# Patient Record
Sex: FEMALE | Race: AMERICAN INDIAN OR ALASKA NATIVE | NOT HISPANIC OR LATINO | Employment: UNEMPLOYED | URBAN - METROPOLITAN AREA
[De-identification: names, ages, dates, MRNs, and addresses within clinical notes are randomized per-mention and may not be internally consistent; named-entity substitution may affect disease eponyms.]

---

## 2019-01-14 ENCOUNTER — HOSPITAL ENCOUNTER (EMERGENCY)
Facility: HOSPITAL | Age: 44
Discharge: HOME | End: 2019-01-14
Attending: EMERGENCY MEDICINE

## 2019-01-14 VITALS
RESPIRATION RATE: 20 BRPM | HEIGHT: 64 IN | SYSTOLIC BLOOD PRESSURE: 178 MMHG | OXYGEN SATURATION: 100 % | TEMPERATURE: 96 F | DIASTOLIC BLOOD PRESSURE: 79 MMHG | BODY MASS INDEX: 39.95 KG/M2 | WEIGHT: 234 LBS | HEART RATE: 90 BPM

## 2019-01-14 DIAGNOSIS — J01.00 ACUTE MAXILLARY SINUSITIS, RECURRENCE NOT SPECIFIED: ICD-10-CM

## 2019-01-14 DIAGNOSIS — H66.001 ACUTE SUPPURATIVE OTITIS MEDIA OF RIGHT EAR WITHOUT SPONTANEOUS RUPTURE OF TYMPANIC MEMBRANE, RECURRENCE NOT SPECIFIED: Primary | ICD-10-CM

## 2019-01-14 LAB
B-HCG UR QL: NEGATIVE
CTP QC/QA: YES

## 2019-01-14 PROCEDURE — 81025 URINE PREGNANCY TEST: CPT | Mod: ER | Performed by: EMERGENCY MEDICINE

## 2019-01-14 PROCEDURE — 99284 EMERGENCY DEPT VISIT MOD MDM: CPT | Mod: 25,ER

## 2019-01-14 PROCEDURE — 25000003 PHARM REV CODE 250: Mod: ER | Performed by: EMERGENCY MEDICINE

## 2019-01-14 RX ORDER — IBUPROFEN 600 MG/1
600 TABLET ORAL EVERY 6 HOURS PRN
Qty: 20 TABLET | Refills: 0 | OUTPATIENT
Start: 2019-01-14 | End: 2020-01-20

## 2019-01-14 RX ORDER — IBUPROFEN 600 MG/1
600 TABLET ORAL
Status: COMPLETED | OUTPATIENT
Start: 2019-01-14 | End: 2019-01-14

## 2019-01-14 RX ORDER — FLUTICASONE PROPIONATE 50 MCG
2 SPRAY, SUSPENSION (ML) NASAL DAILY
Qty: 1 BOTTLE | Refills: 0 | Status: SHIPPED | OUTPATIENT
Start: 2019-01-14

## 2019-01-14 RX ORDER — DIPHENHYDRAMINE HCL 25 MG
25 CAPSULE ORAL EVERY 6 HOURS PRN
Qty: 20 CAPSULE | Refills: 0 | Status: SHIPPED | OUTPATIENT
Start: 2019-01-14

## 2019-01-14 RX ORDER — AMOXICILLIN AND CLAVULANATE POTASSIUM 875; 125 MG/1; MG/1
1 TABLET, FILM COATED ORAL 2 TIMES DAILY
Qty: 20 TABLET | Refills: 0 | Status: SHIPPED | OUTPATIENT
Start: 2019-01-14 | End: 2019-01-24

## 2019-01-14 RX ORDER — ACETAMINOPHEN 500 MG
1000 TABLET ORAL EVERY 6 HOURS PRN
Qty: 30 TABLET | Refills: 0 | Status: SHIPPED | OUTPATIENT
Start: 2019-01-14

## 2019-01-14 RX ADMIN — IBUPROFEN 600 MG: 600 TABLET ORAL at 12:01

## 2019-01-14 NOTE — ED PROVIDER NOTES
Encounter Date: 1/14/2019    SCRIBE #1 NOTE: I, Catherine Lambert, am scribing for, and in the presence of,  Dr. Romano. I have scribed the following portions of the note - Other sections scribed: HPI, ROS, PE.       History     Chief Complaint   Patient presents with    Otalgia     right ear pain, onset 3 days, denies drainage     43 y.o female presents with right ear pain for 3 days. She notes congestion, mild sore throat, and rhinorrhea. Patient reports sinus pain and pressure getting worse.  Right ear pain is move worse when she opens and closes her mouth.  She took an Asprin at 8:00am this morning.  Aspirin does help a little bit. She denies fever or chills. No known health problems.       The history is provided by the patient.     Review of patient's allergies indicates:  No Known Allergies  Past Medical History:   Diagnosis Date    Ectopic pregnancy 12/15/2013    R     Past Surgical History:   Procedure Laterality Date    REMOVAL, ECTOPIC PREGNANCY N/A 12/15/2013    Performed by [facility] at [facility]    SALPINGECTOMY Right 12/15/2013     Family History   Problem Relation Age of Onset    Arthritis Mother     Diabetes Neg Hx     Hypertension Neg Hx      Social History     Tobacco Use    Smoking status: Never Smoker    Smokeless tobacco: Never Used   Substance Use Topics    Alcohol use: No    Drug use: No     Review of Systems   Constitutional: Negative for chills and fever.   HENT: Positive for congestion, ear pain (right), rhinorrhea and sore throat.    Respiratory: Negative for shortness of breath.    Cardiovascular: Negative for chest pain.   Gastrointestinal: Negative for nausea.   Genitourinary: Negative for dysuria.   Musculoskeletal: Negative for back pain.   Skin: Negative for rash.   Neurological: Negative for weakness.   Hematological: Does not bruise/bleed easily.   All other systems reviewed and are negative.      Physical Exam     Initial Vitals [01/14/19 1115]   BP Pulse Resp  Temp SpO2   137/83 99 20 97.9 °F (36.6 °C) 98 %      MAP       --         Physical Exam    Nursing note and vitals reviewed.  Constitutional: She appears well-developed and well-nourished. She is not diaphoretic. No distress.   HENT:   Head: Normocephalic and atraumatic.   Right Ear: External ear normal. Tympanic membrane is erythematous. No middle ear effusion.   Left Ear: Tympanic membrane, external ear and ear canal normal. Tympanic membrane is not erythematous.  No middle ear effusion.   Nose: Right sinus exhibits maxillary sinus tenderness.   Mouth/Throat: Uvula is midline and mucous membranes are normal. No trismus in the jaw. No uvula swelling. Posterior oropharyngeal erythema present. No oropharyngeal exudate, posterior oropharyngeal edema or tonsillar abscesses.   Eyes: EOM are normal. Pupils are equal, round, and reactive to light.   Neck: Normal range of motion. Neck supple.   Cardiovascular: Normal rate, regular rhythm, normal heart sounds and intact distal pulses. Exam reveals no gallop and no friction rub.    No murmur heard.  Pulmonary/Chest: Breath sounds normal. No respiratory distress. She has no wheezes. She has no rhonchi. She has no rales. She exhibits no tenderness.   Abdominal: Soft. Bowel sounds are normal. She exhibits no distension. There is no tenderness. There is no rebound and no guarding.   Musculoskeletal: Normal range of motion.   Neurological: She is alert and oriented to person, place, and time. She has normal strength. No cranial nerve deficit or sensory deficit.   Skin: Skin is warm and dry. Capillary refill takes less than 2 seconds.   Psychiatric: She has a normal mood and affect. Her behavior is normal.         ED Course   Procedures  Labs Reviewed   POCT URINE PREGNANCY    Urine pregnancy test negative            Medical Decision Making:   Clinical Tests:   Lab Tests: Ordered and Reviewed  ED Management:  UPT negative  Fill and take prescriptions as directed.  Return to the ED  if symptoms worsen or do not resolve.   Answered questions and discussed discharge plan.    Patient feels much better and is ready for discharge.  Follow up with PCP in 1 days.            Scribe Attestation:   Scribe #1: I performed the above scribed service and the documentation accurately describes the services I performed. I attest to the accuracy of the note.     I, Dr. Karina Romano, personally performed the services described in this documentation. This document was produced by a scribe under my direction and in my presence. All medical record entries made by the scribe were at my direction and in my presence.  I have reviewed the chart and agree that the record reflects my personal performance and is accurate and complete. Karina Romano DO.     01/14/2019 12:51 PM             Clinical Impression:     1. Acute suppurative otitis media of right ear without spontaneous rupture of tympanic membrane, recurrence not specified    2. Acute maxillary sinusitis, recurrence not specified                                 Karina Romano DO  01/14/19 8723

## 2020-01-20 ENCOUNTER — HOSPITAL ENCOUNTER (EMERGENCY)
Facility: HOSPITAL | Age: 45
Discharge: HOME | End: 2020-01-20
Attending: EMERGENCY MEDICINE
Payer: MEDICAID

## 2020-01-20 VITALS
BODY MASS INDEX: 34.8 KG/M2 | WEIGHT: 177.25 LBS | TEMPERATURE: 97 F | RESPIRATION RATE: 16 BRPM | HEIGHT: 60 IN | DIASTOLIC BLOOD PRESSURE: 55 MMHG | HEART RATE: 66 BPM | OXYGEN SATURATION: 96 % | SYSTOLIC BLOOD PRESSURE: 114 MMHG

## 2020-01-20 DIAGNOSIS — Z32.01 POSITIVE PREGNANCY TEST: Primary | ICD-10-CM

## 2020-01-20 LAB
B-HCG UR QL: POSITIVE
BILIRUBIN, POC UA: NEGATIVE
BLOOD, POC UA: NEGATIVE
CLARITY, POC UA: ABNORMAL
COLOR, POC UA: ABNORMAL
CTP QC/QA: YES
GLUCOSE, POC UA: NEGATIVE
KETONES, POC UA: NEGATIVE
LEUKOCYTE EST, POC UA: ABNORMAL
NITRITE, POC UA: NEGATIVE
PH UR STRIP: 7 [PH]
PROTEIN, POC UA: NEGATIVE
SPECIFIC GRAVITY, POC UA: 1.02
UROBILINOGEN, POC UA: 0.2 E.U./DL

## 2020-01-20 PROCEDURE — 99284 EMERGENCY DEPT VISIT MOD MDM: CPT | Mod: 25,ER

## 2020-01-20 PROCEDURE — 81025 URINE PREGNANCY TEST: CPT | Mod: ER | Performed by: NURSE PRACTITIONER

## 2020-01-20 PROCEDURE — 81003 URINALYSIS AUTO W/O SCOPE: CPT | Mod: ER

## 2020-01-20 RX ORDER — ONDANSETRON 4 MG/1
4 TABLET, ORALLY DISINTEGRATING ORAL EVERY 8 HOURS PRN
Qty: 20 TABLET | Refills: 0 | Status: SHIPPED | OUTPATIENT
Start: 2020-01-20

## 2020-01-20 NOTE — ED PROVIDER NOTES
Encounter Date: 2020    SCRIBE #1 NOTE: I, Javi Lee, am scribing for, and in the presence of,  MARTINA Villanueva. I have scribed the following portions of the note - Other sections scribed: HPI, ROS, PE.       History     Chief Complaint   Patient presents with    Possible Pregnancy     needs a pregnancy test.      44 year old female presenting to the ED to be examined for a possible pregnancy. Patient reports having intermittent nausea, and but has no symptoms or pain today. Patient denies fever, fatigue, chest pain, shortness of breath, abdominal pain, vomiting, diarrhea, dysuria, hematuria, rash, numbness, weakness, tingling, vaginal discharge, vaginal bleeding, or any additional complaints. Last menstrual period was on 2019. .  Patient has no complaints at present            The history is provided by the patient. No  was used.     Review of patient's allergies indicates:  No Known Allergies  Past Medical History:   Diagnosis Date    Ectopic pregnancy 12/15/2013    R     Past Surgical History:   Procedure Laterality Date    SALPINGECTOMY Right 12/15/2013     Family History   Problem Relation Age of Onset    Arthritis Mother     Diabetes Neg Hx     Hypertension Neg Hx      Social History     Tobacco Use    Smoking status: Never Smoker    Smokeless tobacco: Never Used   Substance Use Topics    Alcohol use: No    Drug use: No     Review of Systems   Constitutional: Negative for chills and fever.   HENT: Negative for congestion, ear pain, rhinorrhea, sore throat and trouble swallowing.    Eyes: Negative for pain, discharge and redness.   Respiratory: Negative for cough and shortness of breath.    Cardiovascular: Negative for chest pain.   Gastrointestinal: Positive for nausea. Negative for abdominal pain, diarrhea and vomiting.   Genitourinary: Negative for decreased urine volume and dysuria.   Musculoskeletal: Negative for back pain, neck pain and neck stiffness.    Skin: Negative for rash.   Neurological: Negative for dizziness, weakness, light-headedness, numbness and headaches.   Psychiatric/Behavioral: Negative for confusion.       Physical Exam     Initial Vitals [01/20/20 1142]   BP Pulse Resp Temp SpO2   (!) 106/42 70 18 97.3 °F (36.3 °C) 100 %      MAP       --         Physical Exam    Nursing note and vitals reviewed.  Constitutional: Vital signs are normal. She appears well-developed and well-nourished.  Non-toxic appearance. She does not appear ill.   HENT:   Head: Normocephalic and atraumatic.   Right Ear: External ear normal.   Left Ear: External ear normal.   Nose: Nose normal.   Eyes: Conjunctivae are normal.   Neck: Normal range of motion. Neck supple.   Cardiovascular: Normal rate, regular rhythm and intact distal pulses. Exam reveals no gallop and no friction rub.    No murmur heard.  Pulmonary/Chest: Effort normal and breath sounds normal. No respiratory distress. She has no wheezes. She has no rhonchi. She has no rales. She exhibits no tenderness.   Abdominal: Soft. There is no tenderness.   Musculoskeletal: Normal range of motion.   Neurological: She is alert and oriented to person, place, and time. Gait normal. GCS eye subscore is 4. GCS verbal subscore is 5. GCS motor subscore is 6.   Skin: Skin is warm, dry and intact. No rash noted.   Psychiatric: She has a normal mood and affect. Her speech is normal and behavior is normal. Judgment and thought content normal.         ED Course   Procedures  Labs Reviewed   POCT URINE PREGNANCY - Abnormal; Notable for the following components:       Result Value    POC Preg Test, Ur Positive (*)     All other components within normal limits   POCT URINALYSIS W/O SCOPE - Abnormal; Notable for the following components:    Leukocytes, UA Trace (*)     All other components within normal limits   POCT URINALYSIS W/O SCOPE          Imaging Results    None          Medical Decision Making:   History:   Old Medical Records:  I decided to obtain old medical records.  Clinical Tests:   Lab Tests: Ordered and Reviewed  The following lab test(s) were unremarkable: UPT       APC / Resident Notes:   This is an evaluation of a 44 y.o. female that presents to the Emergency Department for pregnancy test    Physical Exam shows a non-toxic, afebrile, and well appearing female. Normal physical exam with soft non-tender abdomen     Vital signs are reassuring. If available, previous records reviewed.   RESULTS: UPT negative, UA showed no infection    My overall impression is early pregnancy. I considered, but at this time, do not suspect ectopic pregnancy, UTI, cervicitis, vaginal bleeding, vaginal discharge.    ED Course: UA, UPT. D/C Meds: prenatal vitamins, zofran. D/C Information: f/u, medications. The diagnosis, treatment plan, instructions for follow-up and reevaluation with OBGYN as well as ED return precautions were discussed and understanding was verbalized. All questions or concerns have been addressed.            Scribe Attestation:   Scribe #1: I performed the above scribed service and the documentation accurately describes the services I performed. I attest to the accuracy of the note.    Physician Attestation for Scribe:  Physician Attestation Statement for Scribe: I, MARTINA Villanueva, reviewed documentation, as scribed by Javi Lee in my presence, and it is both accurate and complete.                               Clinical Impression:     1. Positive pregnancy test            Disposition:   Disposition: Discharged  Condition: Stable                     MARTINA Landry  01/20/20 2452

## 2020-01-20 NOTE — DISCHARGE INSTRUCTIONS
Based on your last menstrual cycle your are approximately 10 week pregnant.  Please fill your prenatal vitamins.  You can take Zofran for nausea as prescribed.  Please follow up with Dr. Raymond TAFOYA for further evaluation.  Return to the ED for any new or worsening symptoms.

## 2022-10-31 ENCOUNTER — HOSPITAL ENCOUNTER (EMERGENCY)
Facility: HOSPITAL | Age: 47
Discharge: HOSPICE | End: 2022-10-31
Attending: EMERGENCY MEDICINE
Payer: MEDICAID

## 2022-10-31 VITALS
SYSTOLIC BLOOD PRESSURE: 131 MMHG | BODY MASS INDEX: 37.89 KG/M2 | HEART RATE: 66 BPM | WEIGHT: 194 LBS | TEMPERATURE: 98 F | DIASTOLIC BLOOD PRESSURE: 62 MMHG | OXYGEN SATURATION: 100 % | RESPIRATION RATE: 20 BRPM

## 2022-10-31 DIAGNOSIS — M25.561 RIGHT KNEE PAIN: ICD-10-CM

## 2022-10-31 LAB
B-HCG UR QL: NEGATIVE
CTP QC/QA: YES

## 2022-10-31 PROCEDURE — 25000003 PHARM REV CODE 250: Mod: ER | Performed by: NURSE PRACTITIONER

## 2022-10-31 PROCEDURE — 81025 URINE PREGNANCY TEST: CPT | Mod: ER | Performed by: EMERGENCY MEDICINE

## 2022-10-31 PROCEDURE — 99283 EMERGENCY DEPT VISIT LOW MDM: CPT | Mod: ER

## 2022-10-31 RX ORDER — LIDOCAINE 50 MG/G
1 PATCH TOPICAL
Status: DISCONTINUED | OUTPATIENT
Start: 2022-10-31 | End: 2022-10-31 | Stop reason: HOSPADM

## 2022-10-31 RX ORDER — SULINDAC 150 MG/1
150 TABLET ORAL 2 TIMES DAILY
Qty: 10 TABLET | Refills: 0 | Status: SHIPPED | OUTPATIENT
Start: 2022-10-31 | End: 2022-11-05

## 2022-10-31 RX ADMIN — LIDOCAINE 1 PATCH: 50 PATCH TOPICAL at 01:10

## 2022-10-31 NOTE — DISCHARGE INSTRUCTIONS
You have been prescribed clinoril (sulindac), an anti-inflammatory.  Take this medication whether you feel you need it or not.  Do not take ibuprofen, naproxen or other NSAID's medications while taking this medication. , You may use Lidocaine patches over the counter as directed on package.  Do not combine this product with any other therapies or products except as directed.   , You may use Capsaicin over the counter as directed on package.  Do not combine this product with any other therapies or products except as directed.   , You may use Voltaren Gel over the counter as directed on package.  Do not combine this product with any other therapies or products except as directed.     , and Return to the Emergency Department for any worsening, change in condition, or any emergent concerns.

## 2022-10-31 NOTE — ED PROVIDER NOTES
"Encounter Date: 10/31/2022    SCRIBE #1 NOTE: I, Simran Aguero, am scribing for, and in the presence of,  Jin Patel DNP. I have scribed the following portions of the note - Other sections scribed: HPI, ROS, PE.     History     Chief Complaint   Patient presents with    Knee Pain     Pt states," My right knne been hurting a good little minute."     47 y.o. female with no pertinent PMHx who presents to the ER for nontraumatic anteromedial right knee pain for 3 months. When asked to qualify the pain she describes as "hurting". Patient has taken Tylenol for these symptoms. Denies numbness or tingling. Denies known injury. Ambulatory. No further complaints.    The history is provided by the patient. No  was used.   Review of patient's allergies indicates:  No Known Allergies  Past Medical History:   Diagnosis Date    Ectopic pregnancy 12/15/2013    R     Past Surgical History:   Procedure Laterality Date    SALPINGECTOMY Right 12/15/2013     Family History   Problem Relation Age of Onset    Arthritis Mother     Diabetes Neg Hx     Hypertension Neg Hx      Social History     Tobacco Use    Smoking status: Never    Smokeless tobacco: Never   Substance Use Topics    Alcohol use: No    Drug use: No     Review of Systems   Constitutional:  Negative for chills, fatigue and fever.   HENT:  Negative for congestion, ear discharge, ear pain, postnasal drip, rhinorrhea, sinus pressure, sneezing, sore throat and voice change.    Eyes:  Negative for discharge and itching.   Respiratory:  Negative for cough, shortness of breath and wheezing.    Cardiovascular:  Negative for chest pain, palpitations and leg swelling.   Gastrointestinal:  Negative for abdominal pain, constipation, diarrhea, nausea and vomiting.   Endocrine: Negative for polydipsia, polyphagia and polyuria.   Genitourinary:  Negative for dysuria, frequency, hematuria and urgency.   Musculoskeletal:  Positive for arthralgias (R knee). Negative for " myalgias.   Skin:  Negative for rash and wound.   Neurological:  Negative for dizziness, seizures, syncope, weakness and numbness.        (-) Tingling.   Hematological:  Negative for adenopathy. Does not bruise/bleed easily.   Psychiatric/Behavioral:  Negative for self-injury and suicidal ideas. The patient is not nervous/anxious.      Physical Exam     Initial Vitals [10/31/22 1204]   BP Pulse Resp Temp SpO2   135/82 67 16 98 °F (36.7 °C) 97 %      MAP       --         Physical Exam    Nursing note and vitals reviewed.  Constitutional: She appears well-developed and well-nourished.   HENT:   Head: Normocephalic and atraumatic.   Right Ear: External ear normal.   Left Ear: External ear normal.   Nose: Nose normal.   Eyes: Conjunctivae and EOM are normal. Pupils are equal, round, and reactive to light. Right eye exhibits no discharge. Left eye exhibits no discharge.   Neck:   Normal range of motion.  Abdominal: She exhibits no distension.   Musculoskeletal:         General: Normal range of motion.      Cervical back: Normal range of motion.      Comments: Right knee without redness, warmth, swelling, ttp, distal psm intact.     Neurological: She is alert and oriented to person, place, and time.   Skin: Skin is dry. Capillary refill takes less than 2 seconds.       ED Course   Procedures  Labs Reviewed   POCT URINE PREGNANCY          Imaging Results              X-Ray Knee 3 View Right (Final result)  Result time 10/31/22 13:25:22      Final result by Sushil Quintero MD (10/31/22 13:25:22)                   Impression:      See above      Electronically signed by: Sushil Quintero MD  Date:    10/31/2022  Time:    13:25               Narrative:    EXAMINATION:  XR KNEE 3 VIEW RIGHT    CLINICAL HISTORY:  Pain in right knee    TECHNIQUE:  AP, lateral, and Merchant views of the right knee were performed.    COMPARISON:  None    FINDINGS:  No fracture or dislocation.  No bone destruction identified.  Mild DJD.                                        Medications - No data to display  Medical Decision Making:   History:   Old Medical Records: I decided to obtain old medical records.  Initial Assessment:   47 y.o. female with no pertinent PMHx who presents to the ER for nontraumatic anteromedial right knee pain for 3 months. UPT negative. X-ray of right knee ordered.   Differential Diagnosis:   Includes but is not limited to: Strain, sprain, contusion, dislocation, fracture, arthritis  Clinical Tests:   Lab Tests: Ordered and Reviewed  Radiological Study: Ordered and Reviewed        Scribe Attestation:   Scribe #1: I performed the above scribed service and the documentation accurately describes the services I performed. I attest to the accuracy of the note.      ED Course as of 10/31/22 1611   Mon Oct 31, 2022   1311 Preg Test, Ur: Negative [VC]   1311 BP: 135/82 [VC]   1311 Temp: 98 °F (36.7 °C) [VC]   1311 Temp src: Oral [VC]   1311 Pulse: 67 [VC]   1311 Resp: 16 [VC]   1311 SpO2: 97 % [VC]      ED Course User Index  [VC] Jin Patel DNP        X-ray of the knee reveals degenerative joint disease.  This is likely osteo arthritis.  A lidocaine patch and Ace wrap was applied and the patient should follow up with primary care for further diagnostics and testing as needed or referral to Orthopedics.       Scribe attestation: Scribe attestation: I, Jin Patel DNP ACNP-BC FNP-C ENP-C,  personally performed the services described in this documentation. All medical record entries made by the scribe were at my direction and in my presence.  I have reviewed the chart and agree that the record reflects my personal performance and is accurate and complete.   Clinical Impression:   Final diagnoses:  [M25.561] Right knee pain        ED Disposition Condition    Discharge Stable        Vital signs at the time of disposition were:  /62 (BP Location: Left arm)   Pulse 66   Temp 98 °F (36.7 °C) (Oral)   Resp 20   Wt 88 kg (194 lb)    LMP  (LMP Unknown)   SpO2 100%   BMI 37.89 kg/m²       See AVS for additional recommendations. Medications listed herein were prescribed after reviewing the patient's allergies, medication list, history, most recent laboratories as available.  Referrals below were provided after reviewing the patient's previous medical providers. She understands she  should return for any worsening or changes in condition.  Prior to discharge the patient was asked if she  had any additional concerns or complaints and she declined. The patient was given an opportunity to ask questions and all were answered to her satisfaction.  ED Prescriptions       Medication Sig Dispense Start Date End Date Auth. Provider    sulindac (CLINORIL) 150 MG tablet Take 1 tablet (150 mg total) by mouth 2 (two) times daily. for 5 days 10 tablet 10/31/2022 11/5/2022 Jin Patel DNP          Follow-up Information       Follow up With Specialties Details Why Contact Info    [facility] [place]  Schedule an appointment as soon as possible for a visit   [address]  [place] [address]  [phone]               Jin Patel DNP  10/31/22 4265

## 2023-03-02 ENCOUNTER — HOSPITAL ENCOUNTER (OUTPATIENT)
Facility: HOSPITAL | Age: 48
Discharge: HOME | End: 2023-03-02
Attending: EMERGENCY MEDICINE | Admitting: NURSE PRACTITIONER
Payer: MEDICAID

## 2023-03-02 VITALS
RESPIRATION RATE: 18 BRPM | SYSTOLIC BLOOD PRESSURE: 136 MMHG | OXYGEN SATURATION: 98 % | BODY MASS INDEX: 30.73 KG/M2 | WEIGHT: 180 LBS | HEART RATE: 84 BPM | TEMPERATURE: 98 F | DIASTOLIC BLOOD PRESSURE: 70 MMHG | HEIGHT: 64 IN

## 2023-03-02 DIAGNOSIS — L03.213 PERIORBITAL CELLULITIS OF LEFT EYE: Primary | ICD-10-CM

## 2023-03-02 DIAGNOSIS — H05.012 ABSCESS OF LEFT PERIORBITAL REGION: ICD-10-CM

## 2023-03-02 LAB
ALBUMIN SERPL-MCNC: 3.4 G/DL (ref 3.3–5.5)
ALLENS TEST: ABNORMAL
ALP SERPL-CCNC: 60 U/L (ref 42–141)
B-HCG UR QL: NEGATIVE
BILIRUB SERPL-MCNC: 0.6 MG/DL (ref 0.2–1.6)
BUN SERPL-MCNC: 9 MG/DL (ref 7–22)
CALCIUM SERPL-MCNC: 9.7 MG/DL (ref 8–10.3)
CHLORIDE SERPL-SCNC: 106 MMOL/L (ref 98–108)
CREAT SERPL-MCNC: 0.7 MG/DL (ref 0.6–1.2)
CTP QC/QA: YES
GLUCOSE SERPL-MCNC: 98 MG/DL (ref 73–118)
HCO3 UR-SCNC: 24.9 MMOL/L (ref 24–28)
LDH SERPL L TO P-CCNC: 0.61 MMOL/L (ref 0.5–2.2)
PCO2 BLDA: 41.8 MMHG (ref 35–45)
PH SMN: 7.38 [PH] (ref 7.35–7.45)
PO2 BLDA: 35 MMHG (ref 40–60)
POC ALT (SGPT): 12 U/L (ref 10–47)
POC AST (SGOT): 18 U/L (ref 11–38)
POC BE: 0 MMOL/L
POC SATURATED O2: 66 % (ref 95–100)
POC TCO2: 26 MMOL/L (ref 24–29)
POC TCO2: 27 MMOL/L (ref 18–33)
POTASSIUM BLD-SCNC: 3.7 MMOL/L (ref 3.6–5.1)
PROTEIN, POC: 7.5 G/DL (ref 6.4–8.1)
SAMPLE: ABNORMAL
SITE: ABNORMAL
SODIUM BLD-SCNC: 137 MMOL/L (ref 128–145)

## 2023-03-02 PROCEDURE — 82803 BLOOD GASES ANY COMBINATION: CPT | Mod: ER

## 2023-03-02 PROCEDURE — 25000003 PHARM REV CODE 250

## 2023-03-02 PROCEDURE — 12000002 HC ACUTE/MED SURGE SEMI-PRIVATE ROOM

## 2023-03-02 PROCEDURE — G0378 HOSPITAL OBSERVATION PER HR: HCPCS

## 2023-03-02 PROCEDURE — 25500020 PHARM REV CODE 255: Mod: ER | Performed by: EMERGENCY MEDICINE

## 2023-03-02 PROCEDURE — 80053 COMPREHEN METABOLIC PANEL: CPT | Mod: ER

## 2023-03-02 PROCEDURE — 87077 CULTURE AEROBIC IDENTIFY: CPT | Performed by: STUDENT IN AN ORGANIZED HEALTH CARE EDUCATION/TRAINING PROGRAM

## 2023-03-02 PROCEDURE — 87070 CULTURE OTHR SPECIMN AEROBIC: CPT | Performed by: STUDENT IN AN ORGANIZED HEALTH CARE EDUCATION/TRAINING PROGRAM

## 2023-03-02 PROCEDURE — 87040 BLOOD CULTURE FOR BACTERIA: CPT | Performed by: NURSE PRACTITIONER

## 2023-03-02 PROCEDURE — 63600175 PHARM REV CODE 636 W HCPCS: Performed by: EMERGENCY MEDICINE

## 2023-03-02 PROCEDURE — 96375 TX/PRO/DX INJ NEW DRUG ADDON: CPT | Mod: 59

## 2023-03-02 PROCEDURE — 81025 URINE PREGNANCY TEST: CPT | Mod: ER | Performed by: NURSE PRACTITIONER

## 2023-03-02 PROCEDURE — 99285 EMERGENCY DEPT VISIT HI MDM: CPT | Mod: 25

## 2023-03-02 PROCEDURE — 81003 URINALYSIS AUTO W/O SCOPE: CPT | Mod: ER

## 2023-03-02 PROCEDURE — 96365 THER/PROPH/DIAG IV INF INIT: CPT

## 2023-03-02 PROCEDURE — 10060 I&D ABSCESS SIMPLE/SINGLE: CPT | Mod: ER

## 2023-03-02 PROCEDURE — 87075 CULTR BACTERIA EXCEPT BLOOD: CPT | Mod: 59 | Performed by: STUDENT IN AN ORGANIZED HEALTH CARE EDUCATION/TRAINING PROGRAM

## 2023-03-02 PROCEDURE — 25000003 PHARM REV CODE 250: Mod: ER | Performed by: NURSE PRACTITIONER

## 2023-03-02 PROCEDURE — 87186 SC STD MICRODIL/AGAR DIL: CPT | Performed by: STUDENT IN AN ORGANIZED HEALTH CARE EDUCATION/TRAINING PROGRAM

## 2023-03-02 PROCEDURE — 63600175 PHARM REV CODE 636 W HCPCS: Mod: ER | Performed by: NURSE PRACTITIONER

## 2023-03-02 PROCEDURE — 63600175 PHARM REV CODE 636 W HCPCS: Mod: ER | Performed by: EMERGENCY MEDICINE

## 2023-03-02 PROCEDURE — 85025 COMPLETE CBC W/AUTO DIFF WBC: CPT | Mod: ER

## 2023-03-02 PROCEDURE — 96361 HYDRATE IV INFUSION ADD-ON: CPT

## 2023-03-02 RX ORDER — LIDOCAINE HYDROCHLORIDE 10 MG/ML
10 INJECTION INFILTRATION; PERINEURAL
Status: COMPLETED | OUTPATIENT
Start: 2023-03-02 | End: 2023-03-02

## 2023-03-02 RX ORDER — ACETAMINOPHEN 500 MG
500 TABLET ORAL EVERY 8 HOURS PRN
Qty: 21 TABLET | Refills: 0 | Status: SHIPPED | OUTPATIENT
Start: 2023-03-02 | End: 2023-03-09

## 2023-03-02 RX ORDER — VANCOMYCIN HYDROCHLORIDE 1 G/20ML
1000 INJECTION, POWDER, LYOPHILIZED, FOR SOLUTION INTRAVENOUS ONCE
Status: DISCONTINUED | OUTPATIENT
Start: 2023-03-02 | End: 2023-03-02

## 2023-03-02 RX ORDER — ONDANSETRON 2 MG/ML
4 INJECTION INTRAMUSCULAR; INTRAVENOUS
Status: COMPLETED | OUTPATIENT
Start: 2023-03-02 | End: 2023-03-02

## 2023-03-02 RX ORDER — VANCOMYCIN HYDROCHLORIDE 750 MG/15ML
750 INJECTION, POWDER, LYOPHILIZED, FOR SOLUTION INTRAVENOUS ONCE
Status: COMPLETED | OUTPATIENT
Start: 2023-03-02 | End: 2023-03-02

## 2023-03-02 RX ORDER — MORPHINE SULFATE 4 MG/ML
4 INJECTION, SOLUTION INTRAMUSCULAR; INTRAVENOUS
Status: COMPLETED | OUTPATIENT
Start: 2023-03-02 | End: 2023-03-02

## 2023-03-02 RX ORDER — OXYCODONE HYDROCHLORIDE 5 MG/1
5 TABLET ORAL EVERY 6 HOURS PRN
Qty: 12 TABLET | Refills: 0 | Status: SHIPPED | OUTPATIENT
Start: 2023-03-02 | End: 2023-03-05

## 2023-03-02 RX ORDER — SULFAMETHOXAZOLE AND TRIMETHOPRIM 800; 160 MG/1; MG/1
1 TABLET ORAL 2 TIMES DAILY
Qty: 20 TABLET | Refills: 0 | Status: SHIPPED | OUTPATIENT
Start: 2023-03-02 | End: 2023-03-12

## 2023-03-02 RX ORDER — SULFAMETHOXAZOLE AND TRIMETHOPRIM 800; 160 MG/1; MG/1
1 TABLET ORAL
Status: COMPLETED | OUTPATIENT
Start: 2023-03-02 | End: 2023-03-02

## 2023-03-02 RX ORDER — VANCOMYCIN HYDROCHLORIDE 1 G/20ML
1000 INJECTION, POWDER, LYOPHILIZED, FOR SOLUTION INTRAVENOUS ONCE
Status: COMPLETED | OUTPATIENT
Start: 2023-03-02 | End: 2023-03-02

## 2023-03-02 RX ORDER — VANCOMYCIN HYDROCHLORIDE 750 MG/15ML
750 INJECTION, POWDER, LYOPHILIZED, FOR SOLUTION INTRAVENOUS ONCE
Status: DISCONTINUED | OUTPATIENT
Start: 2023-03-02 | End: 2023-03-02

## 2023-03-02 RX ORDER — CEFTRIAXONE 2 G/50ML
2 INJECTION, SOLUTION INTRAVENOUS
Status: COMPLETED | OUTPATIENT
Start: 2023-03-02 | End: 2023-03-02

## 2023-03-02 RX ORDER — HYDROMORPHONE HYDROCHLORIDE 1 MG/ML
1 INJECTION, SOLUTION INTRAMUSCULAR; INTRAVENOUS; SUBCUTANEOUS
Status: COMPLETED | OUTPATIENT
Start: 2023-03-02 | End: 2023-03-02

## 2023-03-02 RX ORDER — ERYTHROMYCIN 5 MG/G
OINTMENT OPHTHALMIC 3 TIMES DAILY
Qty: 3.5 G | Refills: 0 | Status: SHIPPED | OUTPATIENT
Start: 2023-03-02 | End: 2023-03-09

## 2023-03-02 RX ORDER — ERYTHROMYCIN 5 MG/G
OINTMENT OPHTHALMIC
Status: COMPLETED | OUTPATIENT
Start: 2023-03-02 | End: 2023-03-02

## 2023-03-02 RX ADMIN — SULFAMETHOXAZOLE AND TRIMETHOPRIM 1 TABLET: 800; 160 TABLET ORAL at 10:03

## 2023-03-02 RX ADMIN — Medication: at 04:03

## 2023-03-02 RX ADMIN — CEFTRIAXONE 2 G: 2 INJECTION, SOLUTION INTRAVENOUS at 02:03

## 2023-03-02 RX ADMIN — MORPHINE SULFATE 4 MG: 4 INJECTION, SOLUTION INTRAMUSCULAR; INTRAVENOUS at 02:03

## 2023-03-02 RX ADMIN — ERYTHROMYCIN 1 INCH: 5 OINTMENT OPHTHALMIC at 10:03

## 2023-03-02 RX ADMIN — ONDANSETRON 4 MG: 2 INJECTION INTRAMUSCULAR; INTRAVENOUS at 02:03

## 2023-03-02 RX ADMIN — VANCOMYCIN HYDROCHLORIDE 750 MG: 750 INJECTION, POWDER, LYOPHILIZED, FOR SOLUTION INTRAVENOUS at 03:03

## 2023-03-02 RX ADMIN — IOHEXOL 100 ML: 350 INJECTION, SOLUTION INTRAVENOUS at 03:03

## 2023-03-02 RX ADMIN — LIDOCAINE HYDROCHLORIDE 10 ML: 10 INJECTION, SOLUTION INFILTRATION; PERINEURAL at 04:03

## 2023-03-02 RX ADMIN — VANCOMYCIN HYDROCHLORIDE 1000 MG: 1 INJECTION, POWDER, LYOPHILIZED, FOR SOLUTION INTRAVENOUS at 03:03

## 2023-03-02 RX ADMIN — HYDROMORPHONE HYDROCHLORIDE 1 MG: 1 INJECTION, SOLUTION INTRAMUSCULAR; INTRAVENOUS; SUBCUTANEOUS at 09:03

## 2023-03-02 NOTE — ED PROVIDER NOTES
Encounter Date: 3/2/2023    SCRIBE #1 NOTE: I, Marc Diane, am scribing for, and in the presence of,  Rosa Currie NP. I have scribed the following portions of the note - Other sections scribed: HPI, ROS.     History     Chief Complaint   Patient presents with    Abscess     Pt presents to ed with c/o abscess on left eyebrow and left periorbital swelling that started 2 days ago. Pt is unable to hold her eye open for vision test due to swelling.      Wendy Bliss is a 47 y.o. female who presents to the ED for evaluation of left periorbital edema onset 5 days ago. Patient also c/o left eye pain. Patient states the swelling began as a small bump above her eye and worsened over time. She mentions this has not happened before and the pain increases when she tilts her head down. Patient tried using a hot towel to alleviate the symptoms with no relief.  She denies any trauma to the area. No drug, tobacco or EtOH use. Denies eye discharge.  She does not wear glasses or contact lenses.    The history is provided by the patient. No  was used.   Review of patient's allergies indicates:  No Known Allergies  Past Medical History:   Diagnosis Date    Ectopic pregnancy 12/15/2013    R     Past Surgical History:   Procedure Laterality Date    SALPINGECTOMY Right 12/15/2013     Family History   Problem Relation Age of Onset    Arthritis Mother     Diabetes Neg Hx     Hypertension Neg Hx      Social History     Tobacco Use    Smoking status: Never    Smokeless tobacco: Never   Substance Use Topics    Alcohol use: No    Drug use: No     Review of Systems   Constitutional:  Negative for fever.   HENT:  Negative for congestion, sore throat and trouble swallowing.    Eyes:  Positive for pain. Negative for discharge.        (+) eye swelling   Respiratory:  Negative for cough and shortness of breath.    Cardiovascular:  Negative for chest pain.   Gastrointestinal:  Negative for abdominal pain,  constipation, diarrhea, nausea and vomiting.   Genitourinary:  Negative for dysuria, flank pain, frequency and urgency.   Musculoskeletal:  Negative for back pain.   Skin:  Negative for rash.   Neurological:  Negative for headaches.     Physical Exam     Initial Vitals [03/02/23 1248]   BP Pulse Resp Temp SpO2   116/75 81 17 98.4 °F (36.9 °C) 100 %      MAP       --         Physical Exam    Constitutional: She appears well-developed and well-nourished. She is not diaphoretic. No distress.   HENT:   Head: Normocephalic and atraumatic.   Right Ear: External ear normal.   Left Ear: External ear normal.   Mouth/Throat: Oropharynx is clear and moist.   Eyes:   Significant left periorbital edema.  There is supraorbital erythema. Unable to open eye.  Clear tearing noted.   Neck:   Normal range of motion.  Pulmonary/Chest: No respiratory distress.   Musculoskeletal:         General: Normal range of motion.      Cervical back: Normal range of motion.     Neurological: She is alert and oriented to person, place, and time.   Skin: Skin is warm and dry.   Psychiatric: She has a normal mood and affect. Her behavior is normal.             ED Course   Procedures  Labs Reviewed   ISTAT PROCEDURE - Abnormal; Notable for the following components:       Result Value    POC PO2 35 (*)     POC SATURATED O2 66 (*)     All other components within normal limits   CULTURE, BLOOD   CULTURE, BLOOD   POCT CBC   POCT URINE PREGNANCY   POCT CMP   POCT CMP            Imaging Results               CT Maxillofacial With Contrast (Final result)  Result time 03/02/23 16:15:26      Final result by Alex Monteiro MD (03/02/23 16:15:26)                   Impression:      Findings consistent with left-sided preorbital cellulitis with a large preorbital abscess.  No postseptal inflammatory changes to suggest orbital cellulitis.  Ophthalmology consultation and short-term follow-up suggested.    This report was flagged in Epic as abnormal.      Electronically  signed by: Alex Monteiro MD  Date:    03/02/2023  Time:    16:15               Narrative:    EXAMINATION:  CT MAXILLOFACIAL WITH CONTRAST    CLINICAL HISTORY:  Maxillofacial pain;left orbital cellulitis;    TECHNIQUE:  Axial CT scan of the maxillofacial structures was performed with intravenous contrast.  Coronal and sagittal reformats were obtained.  The patient received 100 cc of Omnipaque 350 IV.    COMPARISON:  None    FINDINGS:  The visualized intracranial compartment is within normal limits.  There is no abnormal intracranial enhancement.    The right orbits and intraorbital contents are within normal limits.  The right preorbital soft tissues are within normal limits.    There is marked left-sided preorbital soft tissue swelling with a large fluid collection measuring approximately 3.2 x 1.2 cm in the left preorbital region.  There is both supraorbital and infraorbital component to the collection.  There are no inflammatory changes posterior to the septum.    There is trace mucosal thickening within the ethmoid and maxillary sinuses.  There is wall thickening of the maxillary sinuses.  The mastoid air cells are clear.                                       Medications   vancomycin injection 1,000 mg (1,000 mg Intravenous Given 3/2/23 1551)     And   vancomycin SolR 750 mg (750 mg Intravenous Given 3/2/23 1552)   cefTRIAXone 2 gram/50 mL IVPB 2 g (0 g Intravenous Stopped 3/2/23 1542)   sodium chloride 0.9% bolus 1,000 mL 1,000 mL (0 mLs Intravenous Stopped 3/2/23 1604)   morphine injection 4 mg (4 mg Intravenous Given 3/2/23 1409)   ondansetron injection 4 mg (4 mg Intravenous Given 3/2/23 1408)   iohexoL (OMNIPAQUE 350) injection 100 mL (100 mLs Intravenous Given 3/2/23 1511)   LIDOcaine HCL 10 mg/ml (1%) injection 10 mL (10 mLs Infiltration Given 3/2/23 1648)   LETS (LIDOcaine-TETRAcaine-EPINEPHrine) gel solution ( Topical (Top) Given 3/2/23 1648)     Medical Decision Making:   History:   Old Medical Records:  I decided to obtain old medical records.  Clinical Tests:   Lab Tests: Ordered and Reviewed  Radiological Study: Ordered and Reviewed  ED Management:  47-year-old female with no known medical problems presenting to ED for evaluation of left eye swelling and pain.  Denies fever chills.  On my exam, she is nontoxic.  There is significant left periorbital edema with erythema and induration supraorbital concerning for cellulitis.  Vital signs stable and reassuring.  No leukocytosis on CBC.  Normal lactate.  Blood cultures pending.  Given IV ceftriaxone and vancomycin.  CT scan with findings consistent with left-sided periorbital cellulitis with a large periorbital abscess.  No findings to suggest orbital cellulitis.  Will transfer patient to [facility] for ophthalmology evaluation.  Patient is agreeable to transfer.    This case was discussed with my attending physician Dr. Fuller who examined the patient and agrees with my plan of care.        Scribe Attestation:   Scribe #1: I performed the above scribed service and the documentation accurately describes the services I performed. I attest to the accuracy of the note.      ED Course as of 03/02/23 1724   Thu Mar 02, 2023   1409 POC Lactate: 0.61 [MT]      ED Course User Index  [MT] Rosa Currie NP          I, WILD Currie, personally performed the services described in this documentation.  All medical record entries made by the scribe were at my direction and in my presence.  I have reviewed the chart and agree that the record reflects my personal performance and is accurate and complete.        Clinical Impression:   Final diagnoses:  [L03.213] Periorbital cellulitis of left eye (Primary)  [H05.012] Abscess of left periorbital region        ED Disposition Condition    Transfer to Another Facility Stable                Rosa Currie NP  03/02/23 1725       Rosa Currie NP  03/02/23 1726

## 2023-03-02 NOTE — Clinical Note
"Wendy Mendiolaelle" Izaiah was seen and treated in our emergency department on 3/2/2023.  She may return to work on 03/06/2023.       If you have any questions or concerns, please don't hesitate to call.      Bee Cunha MD"

## 2023-03-03 ENCOUNTER — PATIENT OUTREACH (OUTPATIENT)
Dept: ADMINISTRATIVE | Facility: CLINIC | Age: 48
End: 2023-03-03
Payer: MEDICAID

## 2023-03-03 ENCOUNTER — TELEPHONE (OUTPATIENT)
Dept: OPHTHALMOLOGY | Facility: CLINIC | Age: 48
End: 2023-03-03
Payer: MEDICAID

## 2023-03-03 NOTE — CONSULTS
"Consultation Report  Ophthalmology Service    Date: 03/02/2023    Chief complaint/Reason for Consult: "eye abscess"     History of Present Illness: Wendy Bliss is a 47 y.o. female with no POHx who presents with left preseptal cellulitis with brow soft tissue abscess.  Patient states this all started 3-4 days ago with a small red painful bump overlying her left temporal brow, which steadily grew in size and she began to experience upper and lower eyelid edema in addition to increased size of the original bump. It has not drained.  Denies trauma to the region. Denies hx of prior abscesses or recurrent infections.  Denies fevers, sweats, chills. When pryed open, patient states vision is the same and non blurry.    Patient denies any visual changes in either eye, visual disturbances, such as flashes, floaters, or curtain-veil in visual field, and ocular discomfort with EOM OU.    POcularHx: Denies history of ocular problems or past ocular surgeries.    Current eye gtts: Denies     Family Hx: Denies family history of glaucoma, macular degeneration, or blindness. family history includes Arthritis in her mother.     PMHx:  has a past medical history of Ectopic pregnancy (12/15/2013).     PSurgHx:  has a past surgical history that includes Salpingectomy (Right, 12/15/2013).     Home Medications:   Prior to Admission medications    Medication Sig Start Date End Date Taking? Authorizing Provider   acetaminophen (TYLENOL) 500 MG tablet Take 2 tablets (1,000 mg total) by mouth every 6 (six) hours as needed for Pain. 1/14/19   Karina Romano DO   acetaminophen (TYLENOL) 500 MG tablet Take 1 tablet (500 mg total) by mouth every 8 (eight) hours as needed for Pain. 3/2/23 3/9/23  Bee Cunha MD   diphenhydrAMINE (BENADRYL) 25 mg capsule Take 1 capsule (25 mg total) by mouth every 6 (six) hours as needed (Congestion). 1/14/19   Karina Romano DO   erythromycin (ROMYCIN) ophthalmic ointment Place into the left eye 3 " (three) times daily. for 7 days 3/2/23 3/9/23  Bee Cunha MD   fluticasone (FLONASE) 50 mcg/actuation nasal spray 2 sprays (100 mcg total) by Each Nare route once daily. 1/14/19   Karina Romano DO   ondansetron (ZOFRAN-ODT) 4 MG TbDL Take 1 tablet (4 mg total) by mouth every 8 (eight) hours as needed. 1/20/20   MARTINA Landry   oxyCODONE (ROXICODONE) 5 MG immediate release tablet Take 1 tablet (5 mg total) by mouth every 6 (six) hours as needed for Pain. 3/2/23 3/5/23  Bee Cunha MD   oxycodone-acetaminophen (PERCOCET) 5-325 mg per tablet Take 1 tablet by mouth every 4 (four) hours as needed for Pain. 6/12/16   Ramesh Ayala MD   prenatal vits96-iron fum-folic 27 mg iron- 800 mcg Tab tablet Take 1 tablet by mouth once daily. 1/20/20   MARTINA Landry   sulfamethoxazole-trimethoprim 800-160mg (BACTRIM DS) 800-160 mg Tab Take 1 tablet by mouth 2 (two) times daily. for 10 days 3/2/23 3/12/23  Bee Cunha MD        Medications this encounter:         Allergies: has No Known Allergies.     Social:  reports that she has never smoked. She has never used smokeless tobacco. She reports that she does not drink alcohol and does not use drugs.     ROS: As per HPI    Ocular examination/Dilated fundus examination:  Base Eye Exam       Visual Acuity (Snellen - Linear)         Right Left    Dist sc 20/20 20/20              Tonometry (Tonopen, 10:24 PM)         Right Left    Pressure 20 23              Pupils         Dark Light Shape React APD    Right 5 3 Round Brisk None    Left 5 3 Round Brisk None              Visual Fields         Right Left     Full Full              Extraocular Movement         Right Left     Full, Ortho Full, Ortho              Dilation       Both eyes: 1% Mydriacyl, 2.5% Phenylephrine @ 10:24 PM                  Additional Tests       Color         Right Left    Ishihara full full                  Slit Lamp and Fundus Exam       External Exam         Right Left    External Normal  Firm fluctuance skin abscess focused superior to temporal edge of brow, nearly coming to a head              Slit Lamp Exam         Right Left    Lids/Lashes Normal soft tissue edema without fluctuance to the upper and lower lids, no crepitus    Conjunctiva/Sclera White and quiet White and quiet    Cornea Clear Clear    Anterior Chamber Deep and quiet Deep and quiet    Iris Round and reactive Round and reactive    Lens Clear Clear    Anterior Vitreous Normal Normal              Fundus Exam         Right Left    Disc Normal, CWS nasally Normal    C/D Ratio 0.2 0.2    Macula Normal Normal    Vessels Normal Normal    Periphery Normal Normal                      Assessment/Plan:     1. Soft tissue abscess, left eyebrow  2. Preseptal cellulits 2/2 abscess, left eye  - started 3-4 days ago  - CT max face at OSH without orbital cellulitic findings but confirmed large superficial soft tissue abscess  - no visual compromise, no concern for orbital compartment syndrome or orbital cellulitis at this time given exam  - at OSH got Vanc 1750mg IV, Ceftriaxone 2g IV  - patient verbally consented at bedside for I&D: r/b/a discussed, patient understood and desired drainage. See procedure note for details.  - cultures and gram stain of purulence sent  - start erythromycin topical annmarie to incision site QID  - keep wound covered with clean dressing and change multiple tiems daily as needed - gauze and tape given to patient  - start Bactrim BID po per ED request  - strict return precautions of fever, worsening swelling or pain. Told patient to manually open eye at least 3 times daily to compare vision.   - patient to follow up in oculoplastics clinic in approximately 1 week. Schedulers will contact likely tomorrow.     D/w Dr Leblanc    Patient's Best Contact Number: [phone]    Nikolai Dorantes MD PGY-2  [facility] Ophthalmology Resident  03/02/2023  10:26 PM

## 2023-03-03 NOTE — ED PROVIDER NOTES
Encounter Date: 3/2/2023       History     Chief Complaint   Patient presents with    Abscess     Pt presents to ed with c/o abscess on left eyebrow and left periorbital swelling that started 2 days ago. Pt is unable to hold her eye open for vision test due to swelling.     Transfer     From [place] for Optho. Dx periorbital cellulitis      47-year-old female with no significant past medical history. Patient referred from outside facility with can concern for left eye swelling for 3 days.  Patient reports that for the last 3 days she is been having pain and increased swelling of the left eye to the point where she is not able to open her left eye.  She denies any trauma to this area in additionally denies any vision loss, fevers, chills, nausea.    The history is provided by the patient.   Review of patient's allergies indicates:  No Known Allergies  Past Medical History:   Diagnosis Date    Ectopic pregnancy 12/15/2013    R     Past Surgical History:   Procedure Laterality Date    SALPINGECTOMY Right 12/15/2013     Family History   Problem Relation Age of Onset    Arthritis Mother     Diabetes Neg Hx     Hypertension Neg Hx      Social History     Tobacco Use    Smoking status: Never    Smokeless tobacco: Never   Substance Use Topics    Alcohol use: No    Drug use: No     Review of Systems   Constitutional:  Negative for chills and fever.   HENT:  Negative for rhinorrhea and sneezing.    Eyes:  Positive for pain and visual disturbance. Negative for photophobia.   Respiratory:  Negative for cough, chest tightness, shortness of breath and wheezing.    Cardiovascular:  Negative for chest pain, palpitations and leg swelling.   Gastrointestinal:  Negative for abdominal pain, diarrhea, nausea and vomiting.   Genitourinary:  Negative for decreased urine volume, dysuria, flank pain and urgency.   Musculoskeletal:  Negative for back pain, gait problem, joint swelling and neck pain.   Skin:  Negative for color change, pallor  and rash.   Neurological:  Negative for dizziness, tremors, facial asymmetry, weakness, numbness and headaches.   Hematological:  Negative for adenopathy. Does not bruise/bleed easily.   Psychiatric/Behavioral:  Negative for agitation, confusion and decreased concentration.      Physical Exam     Initial Vitals [03/02/23 1248]   BP Pulse Resp Temp SpO2   116/75 81 17 98.4 °F (36.9 °C) 100 %      MAP       --         Physical Exam    Nursing note and vitals reviewed.    Gen: AxOx3, well nourished, appears stated age, no pallor, no jaundice, appears well hydrated  Eye:   R:  Visual acuity intact, pupil reactive to light, no swelling of eyelids or lesions, no discharge, no generalized injection, EOM intact    L: Visual acuity intact, pupils equal and reactive to light.  Gross swelling of upper and lower eyelids extending up to left eyebrow with abscess with pustular head noted over eyebrow.  Able to open edematous eyelids with some mild pain, however extra ocular movements are intact of left eye and pupil is reactive to light and equal to right eye    Head: normocephalic, atraumatic, no lesions, scalp appears normal  ENT: neck supple, no stridor, no masses, no drooling or voice changes  CVS: All distal pulses intact with normal rate and rhythm, no JVD, normal S1/S2, no murmur  Pulm: Normal breath sounds, no wheezes, rales or rhonchi, no increased work of breathing  Abd: soft, nontender, nondistended, no organomegaly, no CVAT  Ext: no edema, no lesions, rashes, or deformity  Neuro: GCS15, moving all extremities, gait intact, face grossly symmetric  Psych: normal affect, cooperative, well groomed, makes good eye contact      ED Course   Procedures  Labs Reviewed   ISTAT PROCEDURE - Abnormal; Notable for the following components:       Result Value    POC PO2 35 (*)     POC SATURATED O2 66 (*)     All other components within normal limits   CULTURE, BLOOD   CULTURE, BLOOD   CULTURE, AEROBIC  (SPECIFY SOURCE)   CULTURE,  ANAEROBIC   GRAM STAIN   CULTURE, FUNGUS   POCT CBC   POCT URINE PREGNANCY   POCT CMP   POCT CMP          Imaging Results               CT Maxillofacial With Contrast (Final result)  Result time 03/02/23 16:15:26      Final result by Alex Monteiro MD (03/02/23 16:15:26)                   Impression:      Findings consistent with left-sided preorbital cellulitis with a large preorbital abscess.  No postseptal inflammatory changes to suggest orbital cellulitis.  Ophthalmology consultation and short-term follow-up suggested.    This report was flagged in Epic as abnormal.      Electronically signed by: Alex Monteiro MD  Date:    03/02/2023  Time:    16:15               Narrative:    EXAMINATION:  CT MAXILLOFACIAL WITH CONTRAST    CLINICAL HISTORY:  Maxillofacial pain;left orbital cellulitis;    TECHNIQUE:  Axial CT scan of the maxillofacial structures was performed with intravenous contrast.  Coronal and sagittal reformats were obtained.  The patient received 100 cc of Omnipaque 350 IV.    COMPARISON:  None    FINDINGS:  The visualized intracranial compartment is within normal limits.  There is no abnormal intracranial enhancement.    The right orbits and intraorbital contents are within normal limits.  The right preorbital soft tissues are within normal limits.    There is marked left-sided preorbital soft tissue swelling with a large fluid collection measuring approximately 3.2 x 1.2 cm in the left preorbital region.  There is both supraorbital and infraorbital component to the collection.  There are no inflammatory changes posterior to the septum.    There is trace mucosal thickening within the ethmoid and maxillary sinuses.  There is wall thickening of the maxillary sinuses.  The mastoid air cells are clear.                                       Medications   cefTRIAXone 2 gram/50 mL IVPB 2 g (0 g Intravenous Stopped 3/2/23 1542)   sodium chloride 0.9% bolus 1,000 mL 1,000 mL (0 mLs Intravenous Stopped 3/2/23 1604)    morphine injection 4 mg (4 mg Intravenous Given 3/2/23 1409)   ondansetron injection 4 mg (4 mg Intravenous Given 3/2/23 1408)   iohexoL (OMNIPAQUE 350) injection 100 mL (100 mLs Intravenous Given 3/2/23 1511)   vancomycin injection 1,000 mg (1,000 mg Intravenous Given 3/2/23 1551)     And   vancomycin SolR 750 mg (750 mg Intravenous Given 3/2/23 1552)   LIDOcaine HCL 10 mg/ml (1%) injection 10 mL (10 mLs Infiltration Given 3/2/23 1648)   LETS (LIDOcaine-TETRAcaine-EPINEPHrine) gel solution ( Topical (Top) Given 3/2/23 1648)   HYDROmorphone injection 1 mg (1 mg Intravenous Given 3/2/23 2142)   sulfamethoxazole-trimethoprim 800-160mg per tablet 1 tablet (1 tablet Oral Given 3/2/23 2233)   erythromycin 5 mg/gram (0.5 %) ophthalmic ointment (1 inch Left Eye Given 3/2/23 2254)     Medical Decision Making:   History:   Old Records Summarized: records from another hospital.       <> Summary of Records: Patient was seen at outside facility which CT scan was obtained which showed abscess of left brow.  Initial Assessment:   47-year-old female with no significant past medical history. Patient referred from outside facility with can concern for left eye swelling for 3 days. Patient is able to speak, breathing spontaneously, hemodynamically stable, oriented, moving all 4 limbs spontaneously.  Examination is consistent with abscess over left eye with swelling of the upper and lower eyelids of the left eye.  Differential Diagnosis:   Considered abscess, preseptal cellulitis, orbital cellulitis, less likely but considered malignancy.  Clinical Tests:   Lab Tests: Reviewed  Radiological Study: Reviewed  Medical Tests: Reviewed  ED Management:  Patient referred from outside facility with 3 day history of swelling of left eye.  On examination patient's extraocular movements were intact and patient's symptoms are most consistent with preseptal cellulitis with overlying abscess on brow.  Decided to obtain ophthalmology consult who  came and saw the patient and drained patient's abscess.  Patient received pain medication while in the department and was given erythromycin ointment in addition to Bactrim while in the emergency department.  Patient tolerated the procedure well.  Patient's vital signs remained stable while in the emergency department.  I explained to patient required ongoing anti biotics in addition to prescribing pain medication.  Patient understands this plan and patient was discharged home.           ED Course as of 03/02/23 2333   Thu Mar 02, 2023   1409 POC Lactate: 0.61 [MT]      ED Course User Index  [MT] Rosa Currie NP                 Clinical Impression:   Final diagnoses:  [L03.213] Periorbital cellulitis of left eye (Primary)  [H05.012] Abscess of left periorbital region        ED Disposition Condition    Discharge Stable          ED Prescriptions       Medication Sig Dispense Start Date End Date Auth. Provider    oxyCODONE (ROXICODONE) 5 MG immediate release tablet Take 1 tablet (5 mg total) by mouth every 6 (six) hours as needed for Pain. 12 tablet 3/2/2023 3/5/2023 Bee Cunha MD    acetaminophen (TYLENOL) 500 MG tablet Take 1 tablet (500 mg total) by mouth every 8 (eight) hours as needed for Pain. 21 tablet 3/2/2023 3/9/2023 Bee Cunha MD    sulfamethoxazole-trimethoprim 800-160mg (BACTRIM DS) 800-160 mg Tab Take 1 tablet by mouth 2 (two) times daily. for 10 days 20 tablet 3/2/2023 3/12/2023 Bee Cunha MD    erythromycin (ROMYCIN) ophthalmic ointment Place into the left eye 3 (three) times daily. for 7 days 3.5 g 3/2/2023 3/9/2023 Bee Cunha MD          Follow-up Information       Follow up With Specialties Details Why Contact Info    [facility] Emergency Dept Emergency Medicine  As needed, If symptoms worsen [address]  [facility] [address] [phone]    PCP  Schedule an appointment as soon as possible for a visit       [facility] OPHTHALMOLOGY Ophthalmology Call    [address]  [facility] [address]  [phone]             Bee Cunha MD  Resident  03/02/23 3355

## 2023-03-03 NOTE — TELEPHONE ENCOUNTER
----- Message from SANDY Bermeo sent at 3/3/2023  8:34 AM CST -----  Regarding: FW: ED f/u    ----- Message -----  From: Nikolai Dorantes MD  Sent: 3/2/2023  10:34 PM CST  To: [facility] Triage  Subject: ED f/u                                           Hello,    This patient was seen in the ED in regards to preseptal cellulitis with abscess I&D and needs follow up in Dr Osborne's clinic in 1 week.  Please contact them to arrange.    Thank you,    Nikolai Dorantes  [facility]

## 2023-03-03 NOTE — PROCEDURES
Risks, benefits, alternatives discussed with patient at bedside. Patient understood and gave verbal consent for incision and drainage of abscess.  Skin was prepped using alcohol swabs and the site was draped.  Dilaudid IV was given for pain control.  Using an 11 blade, a 0.5cm rudy was performed overlying the lateral brow abscess region where the skin was most thin and the abscess was coming to a head.  Purulence was noticed immediately which was cultured.  Manual compression was used to remove as much pus as possible, approximately 2-3 mL, and intra-abscess loculations were lysed as much as possible without increasing wound size. Wound left open to drain. Ointment was placed over the wound and a loose gauze dressing was placed.  EBL 2 mL. Patient tolerated procedure well.

## 2023-03-03 NOTE — DISCHARGE INSTRUCTIONS
Thank you for coming to our Emergency Department today. It is important to remember that some problems or medical conditions are difficult to diagnose and may not be found or addressed during your Emergency Department visit.     Be sure to follow up with your primary care doctor and review all labs/imaging/tests that were performed during your ER visit with them. Some labs/imaging/tests may be outside of the normal range, and require non-emergent follow-up and/or further investigation/treatment/procedures/testing to help diagnose/exclude/prevent complications or other potentially serious medical conditions that were not discussed or addressed during your ER visit.    If you do not have a primary care doctor, you may contact the one listed on your discharge paperwork or you may also call the [facility] Appointment Desk at [phone] to schedule an appointment and establish care with one. It is important to your health that you have a primary care doctor.    Please take all medications as directed. All medications may potentially have side-effects and it is impossible to predict which medications may give you side-effects or what side-effects (if any) they will give you.. If you feel that you are having a negative effect or side-effect of any medication you should immediately stop taking them and seek medical attention. If you feel that you are having a life-threatening reaction call 911.    Return to the ER with any questions/concerns, new/concerning symptoms, worsening or failure to improve.     Do not drive, swim, climb to height, take a bath, operate heavy machinery, drink alcohol or take potentially sedating medications, sign any legal documents or make any important decisions for 24 hours if you have received any pain medications, sedatives or mood altering drugs during your ER visit or within 24 hours of taking them if they have been prescribed to you.     You can find additional resources for Dentists,  hearing aids, durable medical equipment, low cost pharmacies and other resources at https://[facility].org    BELOW THIS LINE ONLY APPLIES IF YOU HAVE A COVID TEST PENDING OR IF YOU HAVE BEEN DIAGNOSED WITH COVID:  Please access [facility] to review the results of your test. Until the results of your COVID test return, you should isolate yourself so as not to potentially spread illness to others.   If your COVID test returns positive, you should isolate yourself so as not to spread illness to others. After five full days, if you are feeling better and you have not had fever for 24 hours, you can return to your typical daily activities, but you must wear a mask around others for an additional 5 days.   If your COVID test returns negative and you are either unvaccinated or more than six months out from your two-dose vaccine and are not yet boosted, you should still quarantine for 5 full days followed by strict mask use for an additional 5 full days.   If your COVID test returns negative and you have received your 2-dose initial vaccine as well as a booster, you should continue strict mask use for 10 full days after the exposure.  For all those exposed, best practice includes a test at day 5 after the exposure. This can be a home test or a test through one of the many testing centers throughout our community.   Masking is always advised to limit the spread of COVID. Cdc.gov is an excellent site to obtain the latest up to date recommendations regarding COVID and COVID testing.     CDC Testing and Quarantine Guidelines for patients with exposure to a known-positive COVID-19 person:  A close exposure is defined as anyone who has had an exposure (masked or unmasked) to a known COVID -19 positive person within 6 feet of someone for a cumulative total of 15 minutes or more over a 24-hour period.   Vaccinated and/or if you recently had a positive covid test within 90 days do NOT need to quarantine after contact with someone  who had COVID-19 unless you develop symptoms.   Fully vaccinated people who have not had a positive test within 90 days, should get tested 3-5 days after their exposure, even if they don't have symptoms and wear a mask indoors in public for 14 days following exposure or until their test result is negative.      Unvaccinated and/or NOT had a positive test within 90 days and meet close exposure  You are required by CDC guidelines to quarantine for at least 5 days from time of exposure followed by 5 days of strict masking. It is recommended, but not required to test after 5 days, unless you develop symptoms, in which case you should test at that time.  If you get tested after 5 days and your test is positive, your 5 day period of isolation starts the day of the positive test.    If your exposure does not meet the above definition, you can return to your normal daily activities to include social distancing, wearing a mask and frequent handwashing.      Here is a link to guidance from the CDC:  https://www.cdc.gov/media/releases/2021/s1227-isolation-quarantine-guidance.html      [facility] Testing Sites:  https://ldh.la.gov/page/3934      [facility] website with testing locations and guidance:  [email or web address]

## 2023-03-03 NOTE — PROGRESS NOTES
2nd Attempt made to reach patient for TCC call. Left voicemail please call [phone] leave first name, last name, and  for Richard I will return your call.

## 2023-03-03 NOTE — PROGRESS NOTES
C3 nurse attempted to contact Wendy Bliss  for a TCC post hospital discharge follow up call. No answer. The patient does not have a scheduled HOSFU appointment, and the pt does not have an [facility] PCP.

## 2023-03-03 NOTE — ED NOTES
Patient identifiers verified and correct for   LOC: The patient is awake, alert and aware of environment with an appropriate affect, the patient is oriented x 3 and speaking appropriately.   APPEARANCE: Patient appears comfortable and in no acute distress, patient is clean and well groomed.  SKIN: Swelling noted to above left eye.  MUSCULOSKELETAL: Patient moving all extremities spontaneously, no swelling noted.  RESPIRATORY: Airway is open and patent, respirations are spontaneous, patient has a normal effort and rate, no accessory muscle use noted, pt placed on continuous pulse ox with O2 sats noted at 97% on room air.  CARDIAC: Pt placed on cardiac monitor. Patient has a normal rate and regular rhythm, no edema noted, capillary refill < 3 seconds.   GASTRO: Soft and non tender to palpation, no distention noted, normoactive bowel sounds present in all four quadrants. Pt states bowel movements have been regular.  : Pt denies any pain or frequency with urination.  NEURO: Pt opens eyes spontaneously, behavior appropriate to situation, follows commands, facial expression symmetrical, bilateral hand grasp equal and even, purposeful motor response noted, normal sensation in all extremities when touched with a finger.

## 2023-03-06 ENCOUNTER — TELEPHONE (OUTPATIENT)
Dept: OPTOMETRY | Facility: CLINIC | Age: 48
End: 2023-03-06
Payer: MEDICAID

## 2023-03-06 LAB
BACTERIA BLD CULT: NORMAL
BACTERIA BLD CULT: NORMAL
BACTERIA SPEC AEROBE CULT: ABNORMAL

## 2023-03-06 NOTE — PROGRESS NOTES
3rd Attempt made to reach patient for TCC call. Left voicemail please call [phone] leave first name, last name, and  for Richard I will return your call.

## 2023-03-07 LAB — BACTERIA SPEC ANAEROBE CULT: NORMAL
